# Patient Record
Sex: MALE | Race: WHITE | NOT HISPANIC OR LATINO | Employment: OTHER | URBAN - METROPOLITAN AREA
[De-identification: names, ages, dates, MRNs, and addresses within clinical notes are randomized per-mention and may not be internally consistent; named-entity substitution may affect disease eponyms.]

---

## 2021-09-03 ENCOUNTER — APPOINTMENT (OUTPATIENT)
Dept: GENERAL RADIOLOGY | Facility: HOSPITAL | Age: 21
End: 2021-09-03

## 2021-09-03 ENCOUNTER — HOSPITAL ENCOUNTER (EMERGENCY)
Facility: HOSPITAL | Age: 21
Discharge: HOME | End: 2021-09-03
Attending: EMERGENCY MEDICINE | Admitting: EMERGENCY MEDICINE

## 2021-09-03 VITALS
WEIGHT: 179.23 LBS | HEART RATE: 102 BPM | DIASTOLIC BLOOD PRESSURE: 79 MMHG | HEIGHT: 70 IN | BODY MASS INDEX: 25.66 KG/M2 | OXYGEN SATURATION: 96 % | RESPIRATION RATE: 18 BRPM | TEMPERATURE: 98.5 F | SYSTOLIC BLOOD PRESSURE: 149 MMHG

## 2021-09-03 DIAGNOSIS — G56.22 LESION OF LEFT ULNAR NERVE: Primary | ICD-10-CM

## 2021-09-03 PROCEDURE — 73070 X-RAY EXAM OF ELBOW: CPT

## 2021-09-03 PROCEDURE — 99282 EMERGENCY DEPT VISIT SF MDM: CPT

## 2021-09-03 RX ORDER — NAPROXEN 500 MG/1
500 TABLET ORAL 2 TIMES DAILY PRN
Qty: 30 TABLET | Refills: 0 | Status: SHIPPED | OUTPATIENT
Start: 2021-09-03

## 2021-09-03 RX ORDER — PREDNISONE 50 MG/1
50 TABLET ORAL DAILY
Qty: 5 TABLET | Refills: 0 | Status: SHIPPED | OUTPATIENT
Start: 2021-09-03

## 2021-09-03 NOTE — ED PROVIDER NOTES
Subjective   Pain tingling in left pinky since elbow injury few months ago. Left elbow was crushed by johnson of tank a few months ago.      History provided by:  Patient   used: No    Hand Pain  Location:  Left pinky  Severity:  Mild  Onset quality:  Gradual  Duration:  1 month  Timing:  Intermittent  Progression:  Worsening  Chronicity:  New  Associated symptoms: no fatigue and no fever        Review of Systems   Constitutional: Negative for appetite change, chills, fatigue and fever.   HENT: Negative.    Eyes: Negative.  Negative for photophobia.   Respiratory: Negative.    Gastrointestinal: Negative.    Endocrine: Negative.    Genitourinary: Negative.    Musculoskeletal: Negative.    Skin: Negative.    Allergic/Immunologic: Negative.  Negative for immunocompromised state.   Neurological: Negative.    Hematological: Negative.    Psychiatric/Behavioral: Negative.    All other systems reviewed and are negative.      History reviewed. No pertinent past medical history.    No Known Allergies    History reviewed. No pertinent surgical history.    History reviewed. No pertinent family history.    Social History     Socioeconomic History   • Marital status: Significant Other     Spouse name: Not on file   • Number of children: Not on file   • Years of education: Not on file   • Highest education level: Not on file           Objective   Physical Exam  Vitals and nursing note reviewed.   Constitutional:       General: He is not in acute distress.     Appearance: Normal appearance. He is not ill-appearing or toxic-appearing.   HENT:      Head: Normocephalic and atraumatic.   Pulmonary:      Effort: Pulmonary effort is normal.   Musculoskeletal:      Comments: Pinky left is NV in tact  No erythema  Pain on palpation of medial epicondyle of humerus eliciting pain into 5th finger   Neurological:      Mental Status: He is alert and oriented to person, place, and time. Mental status is at baseline.    Psychiatric:         Mood and Affect: Mood normal.         Behavior: Behavior normal.         Thought Content: Thought content normal.         Judgment: Judgment normal.           MDM  Number of Diagnoses or Management Options  Lesion of left ulnar nerve  Diagnosis management comments: Pt is a 20 y.o. male that presents today with Patient presents with:  Upper Extremity Issue       Work up today:  Lab Results (last 24 hours)     ** No results found for the last 24 hours. **      XR ELBOW 2 VIEW LEFT    Result Date: 9/3/2021  PROCEDURE: XR ELBOW 2 VW LEFT  COMPARISON: None  INDICATIONS: LEFT ELBOW PAIN, NUMBNESS SENSATION INTO LEFT HAND.  FINDINGS:  BONES: Normal.  No significant arthropathy or acute abnormality.  SOFT TISSUES: Negative.  No visible soft tissue swelling.  EFFUSION: None visible.  OTHER: Negative.   CONCLUSION: Normal examination.       KAMERON NINO MD       Electronically Signed and Approved By: KAMERON NINO MD on 9/03/2021 at 15:17              @No orders to display     Pt is otherwise non toxic and non ill appearing and stable for d/c home.  Pt is in agreement with this.  All questions answered at bedside.          Amount and/or Complexity of Data Reviewed  Tests in the radiology section of CPT®: reviewed  Independent visualization of images, tracings, or specimens: yes    Risk of Complications, Morbidity, and/or Mortality  Presenting problems: low  Diagnostic procedures: low  Management options: low    Patient Progress  Patient progress: stable      Final diagnoses:   Lesion of left ulnar nerve       ED Disposition  ED Disposition     ED Disposition Condition Comment    Discharge Stable           [facility] [address]  [facility] [address]  [phone]  Schedule an appointment as soon as possible for a visit in 1 day           Medication List      New Prescriptions    naproxen 500 MG EC tablet  Commonly known as: EC NAPROSYN  Take 1  tablet by mouth 2 (Two) Times a Day As Needed for Mild Pain .     predniSONE 50 MG tablet  Commonly known as: DELTASONE  Take 1 tablet by mouth Daily.           Where to Get Your Medications      You can get these medications from any pharmacy    Bring a paper prescription for each of these medications  · naproxen 500 MG EC tablet  · predniSONE 50 MG tablet          Charbel Poole PA  09/03/21 1527

## 2023-01-15 ENCOUNTER — HOSPITAL ENCOUNTER (EMERGENCY)
Facility: HOSPITAL | Age: 23
Discharge: HOME | End: 2023-01-15
Attending: EMERGENCY MEDICINE | Admitting: EMERGENCY MEDICINE
Payer: OTHER GOVERNMENT

## 2023-01-15 ENCOUNTER — APPOINTMENT (OUTPATIENT)
Dept: GENERAL RADIOLOGY | Facility: HOSPITAL | Age: 23
End: 2023-01-15
Payer: OTHER GOVERNMENT

## 2023-01-15 VITALS
HEART RATE: 62 BPM | RESPIRATION RATE: 18 BRPM | HEIGHT: 69 IN | TEMPERATURE: 98.1 F | SYSTOLIC BLOOD PRESSURE: 142 MMHG | BODY MASS INDEX: 27.07 KG/M2 | DIASTOLIC BLOOD PRESSURE: 67 MMHG | OXYGEN SATURATION: 100 % | WEIGHT: 182.76 LBS

## 2023-01-15 DIAGNOSIS — S92.354A CLOSED NONDISPLACED FRACTURE OF FIFTH METATARSAL BONE OF RIGHT FOOT, INITIAL ENCOUNTER: Primary | ICD-10-CM

## 2023-01-15 PROCEDURE — 73610 X-RAY EXAM OF ANKLE: CPT

## 2023-01-15 PROCEDURE — 25010000002 KETOROLAC TROMETHAMINE PER 15 MG: Performed by: EMERGENCY MEDICINE

## 2023-01-15 PROCEDURE — 99283 EMERGENCY DEPT VISIT LOW MDM: CPT

## 2023-01-15 PROCEDURE — 96372 THER/PROPH/DIAG INJ SC/IM: CPT

## 2023-01-15 PROCEDURE — 73630 X-RAY EXAM OF FOOT: CPT

## 2023-01-15 RX ORDER — HYDROCODONE BITARTRATE AND ACETAMINOPHEN 5; 325 MG/1; MG/1
1 TABLET ORAL 4 TIMES DAILY PRN
Qty: 16 TABLET | Refills: 0 | Status: SHIPPED | OUTPATIENT
Start: 2023-01-15

## 2023-01-15 RX ORDER — HYDROCODONE BITARTRATE AND ACETAMINOPHEN 7.5; 325 MG/1; MG/1
1 TABLET ORAL ONCE
Status: COMPLETED | OUTPATIENT
Start: 2023-01-15 | End: 2023-01-15

## 2023-01-15 RX ORDER — KETOROLAC TROMETHAMINE 30 MG/ML
30 INJECTION, SOLUTION INTRAMUSCULAR; INTRAVENOUS ONCE
Status: COMPLETED | OUTPATIENT
Start: 2023-01-15 | End: 2023-01-15

## 2023-01-15 RX ADMIN — KETOROLAC TROMETHAMINE 30 MG: 30 INJECTION, SOLUTION INTRAMUSCULAR; INTRAVENOUS at 01:35

## 2023-01-15 RX ADMIN — HYDROCODONE BITARTRATE AND ACETAMINOPHEN 1 TABLET: 7.5; 325 TABLET ORAL at 01:35

## 2023-01-15 NOTE — ED PROVIDER NOTES
Time: 1:13 AM EST  Date of encounter:  1/15/2023  Independent Historian/Clinical History and Information was obtained by:   Patient  Chief Complaint: foot pain    History is limited by: N/A    History of Present Illness:  Patient is a 22 y.o. year old male who presents to the emergency department for evaluation of foot pain. Pt reports he was running around playing with his dog, the dog hit his R leg, and he rolled his R ankle. He states he has been able to bear weight on his leg, but he reports pain in his R foot with weight-bearing. He states he took ibuprofen prior to arrival. He denies numbness in his R foot. Pt notes he takes Adderall routinely.    HPI    Patient Care Team  Primary Care Provider: Provider, No Known    Past Medical History:     No Known Allergies  History reviewed. No pertinent past medical history.  History reviewed. No pertinent surgical history.  History reviewed. No pertinent family history.    Home Medications:  Prior to Admission medications    Medication Sig Start Date End Date Taking? Authorizing Provider   naproxen (EC NAPROSYN) 500 MG EC tablet Take 1 tablet by mouth 2 (Two) Times a Day As Needed for Mild Pain . 9/3/21   Charbel Poole PA   predniSONE (DELTASONE) 50 MG tablet Take 1 tablet by mouth Daily. 9/3/21   Charbel Poole PA        Social History:          Review of Systems:  Review of Systems   Constitutional: Negative for chills and fever.   HENT: Negative for congestion, rhinorrhea and sore throat.    Eyes: Negative for pain and visual disturbance.   Respiratory: Negative for apnea, cough, chest tightness and shortness of breath.    Cardiovascular: Negative for chest pain and palpitations.   Gastrointestinal: Negative for abdominal pain, diarrhea, nausea and vomiting.   Genitourinary: Negative for difficulty urinating and dysuria.   Musculoskeletal: Positive for arthralgias (R foot pain). Negative for joint swelling and myalgias.   Skin: Negative for color change.  "  Neurological: Negative for seizures, numbness and headaches.   Psychiatric/Behavioral: Negative.    All other systems reviewed and are negative.       Physical Exam:  /67 (BP Location: Left arm, Patient Position: Sitting)   Pulse 62   Temp 98.1 °F (36.7 °C) (Oral)   Resp 18   Ht 175.3 cm (69\")   Wt 82.9 kg (182 lb 12.2 oz)   SpO2 100%   BMI 26.99 kg/m²     Physical Exam  Vitals and nursing note reviewed.   Constitutional:       General: He is not in acute distress.     Appearance: Normal appearance. He is not toxic-appearing.   HENT:      Head: Normocephalic and atraumatic.      Jaw: There is normal jaw occlusion.   Eyes:      General: Lids are normal.      Extraocular Movements: Extraocular movements intact.      Conjunctiva/sclera: Conjunctivae normal.      Pupils: Pupils are equal, round, and reactive to light.   Cardiovascular:      Rate and Rhythm: Normal rate and regular rhythm.      Pulses: Normal pulses.      Heart sounds: Normal heart sounds.   Pulmonary:      Effort: Pulmonary effort is normal. No respiratory distress.      Breath sounds: Normal breath sounds. No wheezing or rhonchi.   Abdominal:      General: Abdomen is flat.      Palpations: Abdomen is soft.      Tenderness: There is no abdominal tenderness. There is no guarding or rebound.   Musculoskeletal:         General: Normal range of motion.      Cervical back: Normal range of motion and neck supple.      Right lower leg: No edema.      Left lower leg: No edema.      Comments: No R lower leg tenderness   Feet:      Comments: Swelling over R dorsal lateral foot  Skin:     General: Skin is warm and dry.   Neurological:      Mental Status: He is alert and oriented to person, place, and time. Mental status is at baseline.   Psychiatric:         Mood and Affect: Mood normal.                  Procedures:  Procedures      Medical Decision Making:      Comorbidities that affect care:    None    External Notes reviewed:    Previous ED " Note      The following orders were placed and all results were independently analyzed by me:  Orders Placed This Encounter   Procedures   • [facility].  Crutches   • XR Foot 3+ View Right   • XR Ankle 3+ View Right       Medications Given in the Emergency Department:  Medications   HYDROcodone-acetaminophen (NORCO) 7.5-325 MG per tablet 1 tablet (1 tablet Oral Given 1/15/23 0135)   ketorolac (TORADOL) injection 30 mg (30 mg Intramuscular Given 1/15/23 0135)        ED Course:    ED Course as of 01/15/23 0646   Sun Laureano 15, 2023   0130 Right foot x-ray by my independent interpretation shows an intra-articular fifth metatarsal fracture that is somewhat distracted.  Radiology read pending. [JS]      ED Course User Index  [JS] Brett Crum MD       Labs:    Lab Results (last 24 hours)     ** No results found for the last 24 hours. **           Imaging:    XR Ankle 3+ View Right    Result Date: 1/15/2023  PROCEDURE: XR ANKLE 3+ VW RIGHT  COMPARISON: 1/15/2023.  INDICATIONS: RIGHT ANKLE PAIN AFTER INJURY.  FINDINGS:  Three views were obtained.  There is an acute intra-articular fracture of the base of the right 5th metatarsal bone.  The fracture is predominantly transversely oriented.  It is comminuted.  It is distracted.  The maximum degree of distraction is approximately 4 to 6 mm.  The fracture is closed.  No other fractures are seen.  No dislocation is identified.         There is an acute intra-articular nondisplaced fracture of the base of the right 5th metatarsal bone, as discussed.  No dislocation is seen.    Please note that portions of this note were completed with a voice recognition program.  TATYANA ALVAREZ JR, MD       Electronically Signed and Approved By: TATYANA ALVAREZ JR, MD on 1/15/2023 at 1:24              XR Foot 3+ View Right    Result Date: 1/15/2023  PROCEDURE: XR FOOT 3+ VW RIGHT  COMPARISON: 1/15/2023.  INDICATIONS: RIGHT FOOT PAIN AFTER INJURY.  FINDINGS: Three views were obtained.   There is an acute intra-articular fracture of the base of the right 5th metatarsal bone.  The fracture is predominantly transversely oriented.  It is comminuted and closed .  It is distracted.  The maximum degree of distraction is approximately 4 to 6 mm.  No other fractures are seen.  No dislocation is identified.        There is an acute intra-articular nondisplaced fracture of the base of the right 5th metatarsal bone, as discussed.  No dislocation is seen.     Please note that portions of this note were completed with a voice recognition program.  TATYANA ALVAREZ JR, MD       Electronically Signed and Approved By: TATYANA ALVAREZ JR, MD on 1/15/2023 at 1:22                  Differential Diagnosis and Discussion:    Extremity Pain: Differential diagnosis includes but is not limited to soft tissue sprain, tendonitis, tendon injury, dislocation, fracture, deep vein thrombosis, arterial insufficiency, osteoarthritis, bursitis, and ligamentous damage.    All X-rays were independently reviewed by me.    MDM         Patient Care Considerations:    CONSULT: I considered consulting Orthopedics, however We will manage conservatively until follow-up.      Consultants/Shared Management Plan:    None    Social Determinants of Health:    Patient is independent, reliable, and has access to care.       Disposition and Care Coordination:    Discharged: The patient is suitable and stable for discharge with no need for consideration of observation or admission.    I have explained the patient´s condition, diagnoses and treatment plan based on the information available to me at this time. I have answered questions and addressed any concerns. The patient has a good  understanding of the patient´s diagnosis, condition, and treatment plan as can be expected at this point. The vital signs have been stable. The patient´s condition is stable and appropriate for discharge from the emergency department.      The patient will pursue further  outpatient evaluation with the primary care physician or other designated or consulting physician as outlined in the discharge instructions. They are agreeable to this plan of care and follow-up instructions have been explained in detail. The patient has received these instructions in written format and have expressed an understanding of the discharge instructions. The patient is aware that any significant change in condition or worsening of symptoms should prompt an immediate return to this or the closest emergency department or call to 911.    Final diagnoses:   Closed nondisplaced fracture of fifth metatarsal bone of right foot, initial encounter        ED Disposition     ED Disposition   Discharge    Condition   Stable    Comment   --             This medical record created using voice recognition software.      Documentation assistance provided by Yadiel Alvarado acting as scribe for Brett Crum MD. Information recorded by the scribe was done at my direction and has been verified and validated by me.       Yadiel Alvarado  01/15/23 0124       Brett Crum MD  01/15/23 0691